# Patient Record
Sex: MALE | Race: BLACK OR AFRICAN AMERICAN
[De-identification: names, ages, dates, MRNs, and addresses within clinical notes are randomized per-mention and may not be internally consistent; named-entity substitution may affect disease eponyms.]

---

## 2019-03-21 ENCOUNTER — HOSPITAL ENCOUNTER (INPATIENT)
Dept: HOSPITAL 74 - JER | Age: 79
LOS: 4 days | Discharge: HOME | DRG: 189 | End: 2019-03-25
Attending: INTERNAL MEDICINE | Admitting: INTERNAL MEDICINE
Payer: COMMERCIAL

## 2019-03-21 VITALS — BODY MASS INDEX: 17.7 KG/M2

## 2019-03-21 DIAGNOSIS — F17.210: ICD-10-CM

## 2019-03-21 DIAGNOSIS — E43: ICD-10-CM

## 2019-03-21 DIAGNOSIS — K59.00: ICD-10-CM

## 2019-03-21 DIAGNOSIS — I10: ICD-10-CM

## 2019-03-21 DIAGNOSIS — E78.5: ICD-10-CM

## 2019-03-21 DIAGNOSIS — E16.2: ICD-10-CM

## 2019-03-21 DIAGNOSIS — F14.10: ICD-10-CM

## 2019-03-21 DIAGNOSIS — I45.81: ICD-10-CM

## 2019-03-21 DIAGNOSIS — J18.9: ICD-10-CM

## 2019-03-21 DIAGNOSIS — D64.9: ICD-10-CM

## 2019-03-21 DIAGNOSIS — E88.09: ICD-10-CM

## 2019-03-21 DIAGNOSIS — J44.1: ICD-10-CM

## 2019-03-21 DIAGNOSIS — G92: ICD-10-CM

## 2019-03-21 DIAGNOSIS — B18.2: ICD-10-CM

## 2019-03-21 DIAGNOSIS — J96.21: ICD-10-CM

## 2019-03-21 DIAGNOSIS — G62.9: ICD-10-CM

## 2019-03-21 DIAGNOSIS — I25.10: ICD-10-CM

## 2019-03-21 DIAGNOSIS — R64: ICD-10-CM

## 2019-03-21 DIAGNOSIS — J96.22: Primary | ICD-10-CM

## 2019-03-21 DIAGNOSIS — F11.20: ICD-10-CM

## 2019-03-21 LAB
ALBUMIN SERPL-MCNC: 2.4 G/DL (ref 3.4–5)
ALP SERPL-CCNC: 69 U/L (ref 45–117)
ALT SERPL-CCNC: 13 U/L (ref 13–61)
ANION GAP SERPL CALC-SCNC: 2 MMOL/L (ref 8–16)
ARTERIAL BLOOD GAS PCO2: 73.4 MMHG (ref 35–45)
ARTERIAL BLOOD GAS PCO2: 76.8 MMHG (ref 35–45)
ARTERIAL PATENCY WRIST A: POSITIVE
ARTERIAL PATENCY WRIST A: POSITIVE
AST SERPL-CCNC: 19 U/L (ref 15–37)
BASE EXCESS BLDA CALC-SCNC: 5.6 MEQ/L (ref -2–2)
BASE EXCESS BLDA CALC-SCNC: 5.6 MEQ/L (ref -2–2)
BASOPHILS # BLD: 0.2 % (ref 0–2)
BILIRUB SERPL-MCNC: 0.3 MG/DL (ref 0.2–1)
BNP SERPL-MCNC: 1295.9 PG/ML (ref 5–450)
BUN SERPL-MCNC: 11 MG/DL (ref 7–18)
CALCIUM SERPL-MCNC: 8.1 MG/DL (ref 8.5–10.1)
CHLORIDE SERPL-SCNC: 102 MMOL/L (ref 98–107)
CO2 SERPL-SCNC: 34 MMOL/L (ref 21–32)
COHGB MFR BLD: 1.8 % (ref 0–2)
COHGB MFR BLD: 2 % (ref 0–2)
CREAT SERPL-MCNC: 0.6 MG/DL (ref 0.55–1.3)
DEPRECATED RDW RBC AUTO: 17.4 % (ref 11.9–15.9)
EOSINOPHIL # BLD: 1 % (ref 0–4.5)
GLUCOSE SERPL-MCNC: 68 MG/DL (ref 74–106)
HCT VFR BLD CALC: 29.3 % (ref 35.4–49)
HGB BLD-MCNC: 9.3 GM/DL (ref 11.7–16.9)
LYMPHOCYTES # BLD: 31.9 % (ref 8–40)
MCH RBC QN AUTO: 27.8 PG (ref 25.7–33.7)
MCHC RBC AUTO-ENTMCNC: 31.9 G/DL (ref 32–35.9)
MCV RBC: 87 FL (ref 80–96)
MONOCYTES # BLD AUTO: 12.9 % (ref 3.8–10.2)
NEUTROPHILS # BLD: 54 % (ref 42.8–82.8)
PLATELET # BLD AUTO: 305 K/MM3 (ref 134–434)
PMV BLD: 7.3 FL (ref 7.5–11.1)
PO2 BLDA: 36.9 MMHG (ref 80–105)
PO2 BLDA: 89.3 MMHG (ref 80–105)
POTASSIUM SERPLBLD-SCNC: 4.5 MMOL/L (ref 3.5–5.1)
PROT SERPL-MCNC: 7.7 G/DL (ref 6.4–8.2)
RBC # BLD AUTO: 3.37 M/MM3 (ref 4–5.6)
SAO2 % BLDA: 56.9 % (ref 95–98)
SAO2 % BLDA: 96 % (ref 95–98)
SODIUM SERPL-SCNC: 139 MMOL/L (ref 136–145)
WBC # BLD AUTO: 4.4 K/MM3 (ref 4–10)
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
## 2019-03-22 LAB
ALBUMIN SERPL-MCNC: 2.5 G/DL (ref 3.4–5)
ALP SERPL-CCNC: 72 U/L (ref 45–117)
ALT SERPL-CCNC: 13 U/L (ref 13–61)
ANION GAP SERPL CALC-SCNC: 3 MMOL/L (ref 8–16)
APTT BLD: 68 SECONDS (ref 25.2–36.5)
APTT BLD: 69.9 SECONDS (ref 25.2–36.5)
ARTERIAL BLOOD GAS PCO2: 63.1 MMHG (ref 35–45)
ARTERIAL PATENCY WRIST A: POSITIVE
AST SERPL-CCNC: 18 U/L (ref 15–37)
BASE EXCESS BLDA CALC-SCNC: 8.8 MEQ/L (ref -2–2)
BASOPHILS # BLD: 0.3 % (ref 0–2)
BILIRUB SERPL-MCNC: 0.6 MG/DL (ref 0.2–1)
BUN SERPL-MCNC: 11 MG/DL (ref 7–18)
CALCIUM SERPL-MCNC: 8.2 MG/DL (ref 8.5–10.1)
CHLORIDE SERPL-SCNC: 99 MMOL/L (ref 98–107)
CO2 SERPL-SCNC: 34 MMOL/L (ref 21–32)
CREAT SERPL-MCNC: 0.6 MG/DL (ref 0.55–1.3)
DEPRECATED RDW RBC AUTO: 17.9 % (ref 11.9–15.9)
EOSINOPHIL # BLD: 0.6 % (ref 0–4.5)
GLUCOSE SERPL-MCNC: 123 MG/DL (ref 74–106)
HCT VFR BLD CALC: 30.8 % (ref 35.4–49)
HGB BLD-MCNC: 9.8 GM/DL (ref 11.7–16.9)
INR BLD: 1.17 (ref 0.83–1.09)
INR BLD: 1.21 (ref 0.83–1.09)
LYMPHOCYTES # BLD: 20.1 % (ref 8–40)
MAGNESIUM SERPL-MCNC: 2 MG/DL (ref 1.8–2.4)
MCH RBC QN AUTO: 27.7 PG (ref 25.7–33.7)
MCHC RBC AUTO-ENTMCNC: 31.7 G/DL (ref 32–35.9)
MCV RBC: 87.2 FL (ref 80–96)
MONOCYTES # BLD AUTO: 8.9 % (ref 3.8–10.2)
NEUTROPHILS # BLD: 70.1 % (ref 42.8–82.8)
PLATELET # BLD AUTO: 323 K/MM3 (ref 134–434)
PMV BLD: 7.4 FL (ref 7.5–11.1)
PO2 BLDA: 99.6 MMHG (ref 80–105)
POTASSIUM SERPLBLD-SCNC: 3.9 MMOL/L (ref 3.5–5.1)
PROT SERPL-MCNC: 8.2 G/DL (ref 6.4–8.2)
PT PNL PPP: 13.8 SEC (ref 9.7–13)
PT PNL PPP: 14.3 SEC (ref 9.7–13)
RBC # BLD AUTO: 3.53 M/MM3 (ref 4–5.6)
SAO2 % BLDA: 97.6 % (ref 95–98)
SODIUM SERPL-SCNC: 136 MMOL/L (ref 136–145)
WBC # BLD AUTO: 6.2 K/MM3 (ref 4–10)

## 2019-03-22 RX ADMIN — METOPROLOL TARTRATE SCH: 25 TABLET, FILM COATED ORAL at 22:32

## 2019-03-22 RX ADMIN — ATORVASTATIN CALCIUM SCH MG: 80 TABLET, FILM COATED ORAL at 22:32

## 2019-03-22 RX ADMIN — GUAIFENESIN SCH MG: 600 TABLET, EXTENDED RELEASE ORAL at 09:40

## 2019-03-22 RX ADMIN — BUDESONIDE AND FORMOTEROL FUMARATE DIHYDRATE SCH: 160; 4.5 AEROSOL RESPIRATORY (INHALATION) at 10:32

## 2019-03-22 RX ADMIN — CLOPIDOGREL BISULFATE SCH MG: 75 TABLET, FILM COATED ORAL at 10:31

## 2019-03-22 RX ADMIN — PREDNISONE SCH MG: 20 TABLET ORAL at 09:40

## 2019-03-22 RX ADMIN — SENNOSIDES SCH TAB: 8.6 TABLET, FILM COATED ORAL at 22:34

## 2019-03-22 RX ADMIN — METHADONE HYDROCHLORIDE SCH MG: 40 TABLET ORAL at 17:49

## 2019-03-22 RX ADMIN — BUDESONIDE AND FORMOTEROL FUMARATE DIHYDRATE SCH PUFF: 160; 4.5 AEROSOL RESPIRATORY (INHALATION) at 22:50

## 2019-03-22 RX ADMIN — METOPROLOL TARTRATE SCH MG: 25 TABLET, FILM COATED ORAL at 09:40

## 2019-03-22 RX ADMIN — CEFTRIAXONE SCH MLS/HR: 1 INJECTION, POWDER, FOR SOLUTION INTRAMUSCULAR; INTRAVENOUS at 10:11

## 2019-03-22 RX ADMIN — GUAIFENESIN SCH MG: 600 TABLET, EXTENDED RELEASE ORAL at 22:34

## 2019-03-22 RX ADMIN — DOXYCYCLINE HYCLATE SCH MG: 100 CAPSULE ORAL at 19:55

## 2019-03-22 RX ADMIN — NICOTINE SCH: 21 PATCH TRANSDERMAL at 10:00

## 2019-03-22 RX ADMIN — ENOXAPARIN SODIUM SCH MG: 40 INJECTION SUBCUTANEOUS at 10:31

## 2019-03-22 RX ADMIN — POLYETHYLENE GLYCOL 3350 SCH: 17 POWDER, FOR SOLUTION ORAL at 10:00

## 2019-03-22 NOTE — CON.PULM
Consult


Consult Specialty:: PULMONARY


Referred by:: JOHN


Reason for Consultation:: HYPOXIC





- History of Present Illness


History of Present Illness: 








patient is a 77 y/o male who was sent in by his PCP for low O2 sat 84%, sob 

worse with exertion, change in his sputum to yellow, and increased cough.  He 

is a current heroin abuser and is on methadone 70 QD and his last heroin use 

was one day pta.  He has h/o CAD and was stented ~1yr ago and is on ASA and 

plavix.   He was placed on BiPap when he got to the ER and was given a dose of 

lasix and nebs. 








- History Source


History Provided By: Medical Record


Limitations to Obtaining History: Clinical Condition





- Past Medical History


Cardio/Vascular: Yes: CAD, Other (STENTS)


Pulmonary: Yes: COPD, Pneumonia.  No: O2 Dependent


Heme/Onc: Yes: Anemia


Psych: Yes: Addictions





- Alcohol/Substance Use


Hx Alcohol Use: No





- Smoking History


Smoking history: Current every day smoker


Have you smoked in the past 12 months: Yes


Aproximately how many cigarettes per day: 10





- Social History


Usual Living Arrangement: Other


Place of Birth: [place]


History of Recent Travel: No





Home Medications





- Allergies


Allergies/Adverse Reactions: 


 Allergies











Allergy/AdvReac Type Severity Reaction Status Date / Time


 


No Known Allergies Allergy   Verified 03/22/19 02:11














- Home Medications


Home Medications: 


Ambulatory Orders





Albuterol Sulfate Inhaler - [Ventolin HFA Inhaler -] 1 - 2 inh PO QID PRN 06/05/ 14 


Atorvastatin Calcium 80 mg PO DAILY 03/22/19 


Clopidogrel Bisulfate [Plavix -] 75 mg PO DAILY 03/22/19 


Ferrous Sulfate 325 mg PO ASDIR 03/22/19 


Furosemide [Lasix -] 40 mg PO DAILY 03/22/19 


Metoprolol Tartrate [Lopressor -] 25 mg PO BID 03/22/19 


Multivitamins [Tab-A-Vit -] 1 tab PO DAILY 03/22/19 











Family Disease History





- Family Disease History


Family History: Unable to Obtain





Review of Systems


Unable to obtain ROS, reason: UNABLE TO OBTAIN





Physical Exam


Vital Sings: 


 Vital Signs











Temperature  97.9 F   03/22/19 15:57


 


Pulse Rate  90   03/22/19 15:57


 


Respiratory Rate  18   03/22/19 15:57


 


Blood Pressure  146/94   03/22/19 15:57


 


O2 Sat by Pulse Oximetry (%)  100   03/22/19 16:41











Constitutional: Yes: Cachectic, Poor Hygeine, Other (LETHARGIC)


Eyes: Yes: EOM Intact


HENT: Yes: Normocephalic


Neck: Yes: Trachea Midline


Cardiovascular: Yes: S1, S2


Respiratory: Yes: Diminished


Gastrointestinal: Yes: Normal Bowel Sounds


Edema: No


Neurological: Yes: Lethargy, Pre-Existing Deficit, Weakness


Labs: 


 CBC, BMP





 03/22/19 03:05 





 03/22/19 03:05 





 ABG Results











ABG pH  7.37  (7.35-7.45)   03/22/19  09:43    


 


ABG pCO2 at Pt Temp  63.1 mmHg (35-45)  H  03/22/19  09:43    


 


ABG pO2 at Pt Temp  99.6 mmHg ()   03/22/19  09:43    


 


ABG HCO3  35.3 mmol/L (22-27)  H  03/22/19  09:43    


 


ABG O2 Sat (Measured)  97.6 % (95-98)   03/22/19  09:43    


 


ABG O2 Content  13.2 % vol (15-22)  L  03/22/19  09:43    


 


ABG Base Excess  8.8 meq/l (-2-2)  H  03/22/19  09:43    








REST REVIEWED





Imaging





- Results


Chest X-ray: Report Reviewed, Image Reviewed


Cat Scan: Report Reviewed, Image Reviewed





Problem List





- Problems


(1) Heroin addiction


Code(s): F11.20 - OPIOID DEPENDENCE, UNCOMPLICATED   





(2) Acute hypercapnic respiratory failure


Code(s): J96.02 - ACUTE RESPIRATORY FAILURE WITH HYPERCAPNIA   





(3) COPD (chronic obstructive pulmonary disease)


Code(s): J44.9 - CHRONIC OBSTRUCTIVE PULMONARY DISEASE, UNSPECIFIED   


Qualifiers: 


   COPD type: unspecified COPD   Qualified Code(s): J44.9 - Chronic obstructive 

pulmonary disease, unspecified   





(4) Hepatitis C


Code(s): B19.20 - UNSPECIFIED VIRAL HEPATITIS C WITHOUT HEPATIC COMA   


Qualifiers: 


   Viral hepatitis chronicity: chronic 





(5) Nicotine dependence


Code(s): F17.200 - NICOTINE DEPENDENCE, UNSPECIFIED, UNCOMPLICATED   


Qualifiers: 


   Nicotine product type: cigarettes 





(6) Opioid dependence with withdrawal


Code(s): F11.23 - OPIOID DEPENDENCE WITH WITHDRAWAL   





Assessment/Plan





ACUTE ON CHRONIC HYPERCAPNEIC RESP FAILURE


COPD/ACTIVE SMOKER


HERION ABUSE/METHADONE MAINTENANCE


ASHD/CAD S/P PCI STENT





WILL LIKELY NEED NIPPV/O2 SUPPLEMENTATION/BRONCHODILATION/TAPER STEROIDS/


TRIAL OF ANTIBIOTICS/AVOID OVERSEDATION/DVT PROPHYLAXSIS








SHERRY HARMON MD
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
## 2019-03-22 NOTE — PN
Teaching Attending Note


Name of Resident: Kb Mustafa





ATTENDING PHYSICIAN STATEMENT





I saw and evaluated the patient.


I reviewed the resident's note and discussed the case with the resident.


I agree with the resident's findings and plan as documented.








SUBJECTIVE:


Seen and examined; please refer to resident note for further historical 

information.  Briefly, patient is a 77 y/o male who was sent in by his PCP for 

low O2 sat 84% in the office.  He has some SOB worse with exertion, change in 

his sputum to yellow, and increased cough.  No recent abx, etc.  He is a 

current heroin abuser and is on methadone 70 QD and his last heroin use was 

yesterday.   He is noted to have LE edema with concurrent venous stasis changes 

and he is furthermore noted that he is on home lasix.  He has an elevated BNP.  

He does have CAD and was stented ~1yr ago and is on ASA and plavix.  He is, 

unfortunately, a poor historian and his family left (who provided hx for 

resident and ED) so I didn't have a chance to confirm it.  He was placed on 

BiPap when he got to the ER and was given a dose of lasix and some nebs and he 

improved; CT chest shows multiple nodular infiltrates and advanced 

emphysematous changes that are likely infectious vs. malignant per the prelim 

read.  He was weaned from BiPap and is satting high 90s on NC.





10 sys ROS done and negative aside from HPI


PMH, PSH, Family Hx, Social Hx reviewed


Medication list reviewed; pending reconciliation


 Home Medications











 Medication  Instructions  Recorded


 


Albuterol Sulfate Inhaler - 1 - 2 inh PO QID PRN 06/05/14





[Ventolin HFA Inhaler -]  


 


Atorvastatin Calcium 80 mg PO DAILY 03/22/19


 


Clopidogrel Bisulfate [Plavix -] 75 mg PO DAILY 03/22/19


 


Ferrous Sulfate 325 mg PO ASDIR 03/22/19


 


Furosemide [Lasix -] 40 mg PO DAILY 03/22/19


 


Metoprolol Tartrate [Lopressor -] 25 mg PO BID 03/22/19


 


Multivitamins [Tab-A-Vit -] 1 tab PO DAILY 03/22/19














OBJECTIVE:


VS, labs, imaging reviewed


NAD, AAO, Resting in bed off BiPap


NC AT EOMI; some temporal wasting


B/l wheezes with some scattered mild rales with no focal consolidation; w/ sym 

exp


NT ND +BS


RRR s1/2 no mgr


CN2-12 wnl, no fnd


Normal mood, appropriate affect, poor insight and difficult to understand speech





Prelim CT shows advanced emphysematous changes with multiple nodular 

infiltrates that could be infectious, but with some nodular components what 

could lead to consideration of underlying neoplasm





ASSESSMENT AND PLAN:


Patient presented with mixed respiratory failure found to be desatting in PCP 

office; now weaned off BiPap.  He is hemodynamically stable and afebrile.  

Cause of his respiratory issues likely COPD exacerbation with fluid overload +/

- CAP vs. underlying malignancy





1) Acute Respiratory Failure


-Improved; based on ABG and hx has a strong chronic component to his 

respiratory failure with some acute input.


-PRN O2; I suspect he will need to be on longterm O2 and he should be checked 

for this prior to DC


-Treat underlying conditions (to be addressed separately)





2) Acute COPD Exacerbation


-PRN O2; PO prednisone 60 QD, doxycycline PO given prolonged QTc, ATC duonebs 

and PRN albuterol


-Consulting pulmonary medicine (Dr. Campbell) for medication optimization.  He 

will need LABA/LAMA, etc. on DC most likely.  No PFTs available.


-Counseled to stop smoking





3) Acute CHF Exacerbation


-Elevated BNP, edema. Monitor strict Is and Os, daily weights. Low sodium diet 

and fluid restriction.


-Lasix 40 IV QD and consider CV consult; checking echo.  Want to obtain old 

records (he is unsure of the name of his primry CV and hasn't followed up with 

him in 'a while')


-Consider switching MT to MS and adding ACE/ARB as BP and renal function 

permits.  Depending on severity of CHF can also consider spironolactone and 

hydralazine.  


-Given his stated history of stenting would assume ischemic cardiomyopathy 

though he has multiple risk factors for NICM.  Need to obtain additional info.





4) Nodular infiltrates (PNA vs. malignancy)


-Seen on prelim CT chest; followup final read.  Does have productive cough but 

notably he is afebrile with a normal white count.


-Empirically covering with ceftriaxone and azithro for CAP and will also 

consult oncology.  Pulmonary is also following and we will defer further 

diagnostic testing to their service.


-Followup sputum cx, blood cx, urine Ag, influenza, viral pcr





5) Heroin Abuse


-Confirm and continue methadone, encourage cessasion


-Checking Hep C and HIV screen





6) Cocaine abuse


-At risk for NICM and sudden cardiac death; no current chest pain.  Could have 

precipitated CHF.





7) Tobacco abuse


-Encourage cesssion





8) Hypoalbuminemia


-Check prealbumin and consider nutrition consult





9) Normocytic anemia


-Trend CBC when inpatient; consider checking iron studies inpt vs. outpt





10) Hx CAD


-States he was stented last year; continue ASA, statin, BB, and plavix.  Obtain 

old records when he finds out who his OP cardio is.  No chest pain.  Has 

multiple risk factors. 





FENA


-2l fluid restriction


-PRN replete; optimize Mg and K


-Cardiac Diet with low salt


-As tolerated





Full Code

## 2019-03-22 NOTE — EKG
Test Reason : 

Blood Pressure : ***/*** mmHG

Vent. Rate : 080 BPM     Atrial Rate : 080 BPM

   P-R Int : 162 ms          QRS Dur : 082 ms

    QT Int : 430 ms       P-R-T Axes : 058 075 087 degrees

   QTc Int : 495 ms

 

NORMAL SINUS RHYTHM WITH SINUS ARRHYTHMIA

POSSIBLE LEFT ATRIAL ENLARGEMENT

PROLONGED QT

ABNORMAL ECG

 

Confirmed by NAEEM GOMEZ MD ([id number]) on 3/22/2019 11:03:32 AM

 

Referred By:             Confirmed By:NAEEM GOMEZ MD
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
## 2019-03-22 NOTE — ECHO
______________________________________________________________________________



Name: RUBI OSULLIVAN                                   Exam:Adult Echocardiogram

MRN: [id number]         Study Date: 2019 07:47 AM

Age: 78 yrs

______________________________________________________________________________



Reason For Study: CHF

Height: 69 in        Weight: 120 lb        BSA: 1.7 m2



______________________________________________________________________________



MMode/2D Measurements & Calculations

IVSd: 0.85 cm                                          Ao root diam: 3.7 cm

LVIDd: 4.8 cm                                          LA dimension: 3.0 cm

LVIDs: 3.8 cm

LVPWd: 0.84 cm



________________________________________________________

EDV(Teich): 107.7 ml                                   LVOT diam: 2.6 cm

ESV(Teich): 60.3 ml



Doppler Measurements & Calculations

MV E max klaus: 78.5 cm/sec                                 AI P1/2t: 581.9 msec

MV A max klaus: 92.3 cm/sec

MV E/A: 0.85

MV dec time: 0.33 sec



___________________________________________________________

AI max klaus: 344.2 cm/sec                                  TR max klaus: 311.6 cm/sec

AI max P.4 mmHg                                      TR max P.9 mmHg



AI dec slope: 173.2 cm/sec2

___________________________________________________________

Med Peak E' Klaus: 2.9 cm/sec

Med E/e': 27.5

Lat Peak E' Klaus: 7.1 cm/sec

Lat E/e': 11.0





______________________________________________________________________________



Left Ventricle

Ejection Fraction = 40-45%. Severe inferolateral hypokinesis.

Right Ventricle

The right ventricle is normal in size and function.

Atria

Normal left and right atrial size and function.

Mitral Valve

The mitral valve is grossly normal. There is no mitral valve stenosis. There is mild mitral regurgita
tion.

Tricuspid Valve

The tricuspid valve is normal in structure and function. There is mild tricuspid regurgitation. Right


ventricular systolic pressure is elevated at 40-50mmHg.

Aortic Valve

The aortic valve is trileaflet. No hemodynamically significant valvular aortic stenosis. Mild aortic

regurgitation.

Pulmonic Valve

The pulmonic valve is not well seen, but is grossly normal. There is no pulmonic valvular stenosis.

Great Vessels

Mild aortic root dilatation.

Pericardium/Pleura

There is no pericardial effusion.

______________________________________________________________________________





Interpretation Summary

Ejection Fraction = 40-45%.

Severe inferolateral hypokinesis.

Mild aortic root dilatation.

The right ventricle is normal in size and function.

There is mild mitral regurgitation.

There is mild tricuspid regurgitation.

Right ventricular systolic pressure is elevated at 40-50mmHg.

Mild aortic regurgitation.

There is no pericardial effusion.





MD Barcenas *Dashawn 2019 10:37 AM

## 2019-03-22 NOTE — EKG
Test Reason : 

Blood Pressure : ***/*** mmHG

Vent. Rate : 073 BPM     Atrial Rate : 073 BPM

   P-R Int : 164 ms          QRS Dur : 078 ms

    QT Int : 460 ms       P-R-T Axes : 048 034 068 degrees

   QTc Int : 506 ms

 

*** POOR DATA QUALITY, INTERPRETATION MAY BE ADVERSELY AFFECTED

NORMAL SINUS RHYTHM

PROLONGED QT

ABNORMAL ECG

Confirmed by NAEEM GOMEZ MD ([id number]) on 3/22/2019 10:58:12 AM

 

Referred By:             Confirmed By:NAEEM GOMEZ MD

## 2019-03-23 LAB
ALBUMIN SERPL-MCNC: 2.1 G/DL (ref 3.4–5)
ALP SERPL-CCNC: 63 U/L (ref 45–117)
ALT SERPL-CCNC: 11 U/L (ref 13–61)
ANION GAP SERPL CALC-SCNC: 3 MMOL/L (ref 8–16)
AST SERPL-CCNC: 12 U/L (ref 15–37)
BILIRUB SERPL-MCNC: 0.2 MG/DL (ref 0.2–1)
BUN SERPL-MCNC: 26 MG/DL (ref 7–18)
CALCIUM SERPL-MCNC: 8.1 MG/DL (ref 8.5–10.1)
CHLORIDE SERPL-SCNC: 98 MMOL/L (ref 98–107)
CO2 SERPL-SCNC: 35 MMOL/L (ref 21–32)
CREAT SERPL-MCNC: 0.8 MG/DL (ref 0.55–1.3)
DEPRECATED RDW RBC AUTO: 17.5 % (ref 11.9–15.9)
GLUCOSE SERPL-MCNC: 144 MG/DL (ref 74–106)
HCT VFR BLD CALC: 29.8 % (ref 35.4–49)
HGB BLD-MCNC: 9.6 GM/DL (ref 11.7–16.9)
MAGNESIUM SERPL-MCNC: 2.1 MG/DL (ref 1.8–2.4)
MCH RBC QN AUTO: 27.5 PG (ref 25.7–33.7)
MCHC RBC AUTO-ENTMCNC: 32.1 G/DL (ref 32–35.9)
MCV RBC: 85.6 FL (ref 80–96)
PHOSPHATE SERPL-MCNC: 3.8 MG/DL (ref 2.5–4.9)
PLATELET # BLD AUTO: 308 K/MM3 (ref 134–434)
PMV BLD: 7.6 FL (ref 7.5–11.1)
POTASSIUM SERPLBLD-SCNC: 4.2 MMOL/L (ref 3.5–5.1)
PROT SERPL-MCNC: 7.2 G/DL (ref 6.4–8.2)
RBC # BLD AUTO: 3.48 M/MM3 (ref 4–5.6)
SERUM IRON SATURATION: 11 % (ref 15–55)
SODIUM SERPL-SCNC: 137 MMOL/L (ref 136–145)
TIBC SERPL-MCNC: 269 UG/DL (ref 250–450)
UIBC SERPL-MCNC: 240 UG/DL (ref 111–343)
WBC # BLD AUTO: 6.8 K/MM3 (ref 4–10)

## 2019-03-23 RX ADMIN — GUAIFENESIN SCH MG: 600 TABLET, EXTENDED RELEASE ORAL at 10:14

## 2019-03-23 RX ADMIN — IPRATROPIUM BROMIDE AND ALBUTEROL SULFATE SCH AMP: .5; 3 SOLUTION RESPIRATORY (INHALATION) at 15:52

## 2019-03-23 RX ADMIN — GUAIFENESIN SCH MG: 600 TABLET, EXTENDED RELEASE ORAL at 22:03

## 2019-03-23 RX ADMIN — METOPROLOL TARTRATE SCH MG: 25 TABLET, FILM COATED ORAL at 22:03

## 2019-03-23 RX ADMIN — CEFTRIAXONE SCH MLS/HR: 1 INJECTION, POWDER, FOR SOLUTION INTRAMUSCULAR; INTRAVENOUS at 10:16

## 2019-03-23 RX ADMIN — DOXYCYCLINE HYCLATE SCH MG: 100 CAPSULE ORAL at 17:33

## 2019-03-23 RX ADMIN — DOXYCYCLINE HYCLATE SCH MG: 100 CAPSULE ORAL at 10:17

## 2019-03-23 RX ADMIN — METHADONE HYDROCHLORIDE SCH: 40 TABLET ORAL at 06:17

## 2019-03-23 RX ADMIN — METOPROLOL TARTRATE SCH: 25 TABLET, FILM COATED ORAL at 10:15

## 2019-03-23 RX ADMIN — SENNOSIDES SCH TAB: 8.6 TABLET, FILM COATED ORAL at 22:03

## 2019-03-23 RX ADMIN — ATORVASTATIN CALCIUM SCH MG: 80 TABLET, FILM COATED ORAL at 22:03

## 2019-03-23 RX ADMIN — IPRATROPIUM BROMIDE AND ALBUTEROL SULFATE SCH: .5; 3 SOLUTION RESPIRATORY (INHALATION) at 21:55

## 2019-03-23 RX ADMIN — POLYETHYLENE GLYCOL 3350 SCH GM: 17 POWDER, FOR SOLUTION ORAL at 10:13

## 2019-03-23 RX ADMIN — NICOTINE SCH MG: 21 PATCH TRANSDERMAL at 10:16

## 2019-03-23 RX ADMIN — BUDESONIDE AND FORMOTEROL FUMARATE DIHYDRATE SCH PUFF: 160; 4.5 AEROSOL RESPIRATORY (INHALATION) at 22:03

## 2019-03-23 RX ADMIN — ENOXAPARIN SODIUM SCH MG: 40 INJECTION SUBCUTANEOUS at 10:12

## 2019-03-23 RX ADMIN — BUDESONIDE AND FORMOTEROL FUMARATE DIHYDRATE SCH PUFF: 160; 4.5 AEROSOL RESPIRATORY (INHALATION) at 10:16

## 2019-03-23 RX ADMIN — CLOPIDOGREL BISULFATE SCH MG: 75 TABLET, FILM COATED ORAL at 10:14

## 2019-03-23 RX ADMIN — PREDNISONE SCH MG: 20 TABLET ORAL at 10:15

## 2019-03-23 RX ADMIN — METHADONE HYDROCHLORIDE SCH MG: 40 TABLET ORAL at 16:41

## 2019-03-23 NOTE — EKG
Test Reason : 

Blood Pressure : ***/*** mmHG

Vent. Rate : 064 BPM     Atrial Rate : 064 BPM

   P-R Int : 176 ms          QRS Dur : 086 ms

    QT Int : 522 ms       P-R-T Axes : 064 076 085 degrees

   QTc Int : 538 ms

 

NORMAL SINUS RHYTHM

LVH MAY BE PRESENT BY VOLTAGE CRITERIA

POSSIBLE LEFT ATRIAL ENLARGEMENT

PROLONGED QT

ABNORMAL ECG

WHEN COMPARED WITH ECG OF 22-MAR-2019 02:56,

NO SIGNIFICANT CHANGE WAS FOUND

Confirmed by ELI CANADA, ANASTASIYA ([id number]) on 3/23/2019 4:59:55 PM

 

Referred By:             Confirmed By:ANASTASIYA BENITEZ MD

## 2019-03-23 NOTE — PN
Teaching Attending Note


Name of Resident: Jonathan Rosales





ATTENDING PHYSICIAN STATEMENT





I saw and evaluated the patient.


I reviewed the resident's note and discussed the case with the resident.


I agree with the resident's findings and plan as documented.








SUBJECTIVE:





Feels better , no HA , no CP , no SOB . 





OBJECTIVE:


NAD, awake, and cooperative 


Cv: RRR, no MRG, no JVD 


Lungs: decreased air entry bilaterally, no wheezing or crackles 


Ext: scarred skin with trace edema on legs.








ASSESSMENT AND PLAN:





77 y/o man with h/o COPD, active smoking, active heroin use while on Methadone, 

CAD , s/p stenting, who presented with hypoxia . he was found to have acuete 

hypoxic hypercapnic respiratory failure 





1- Acute hypoxic , hypercapnic resp failure due to acute COPD exacerbation. 

possible b/l PNA vs old scarring 


condition slightly improved 


- switch to NC to keep Sat O2 88-93 %. 


- cont steorids, Nebs , and Inhalers. 


- cont Abx 


- echo reviewed, mild reductionin EF, but he does not look fluid overloaded 


- cont to use BIPAP at night . 








2- Heroin abuse. while on methadone 70 mg 


- will cont his home dose for now. 


-Appreciate Dr. Valencia help, his dose to be adjusted as out pt 





3- Prolonged QTC: due to methadone and heroin use. 


- avoid meds that can worsen it 





4- Normocytic anemia: iron studies do not suggest iron def. B12, folate NL. f/u 

as out pt 


5- severe protein calorie malnutrition 


6- metabolic encephalopathy , due to hypercapnia , resolved. b12, RPR, TSH nl





DVT PX 


PT eval 





HLOC

## 2019-03-23 NOTE — PN
Progress Note (short form)





- Note


Progress Note: 





PULMONARY








VSS/AFEBRILE


Constitutional: Yes: Cachectic, Poor Hygeine, Other (LETHARGIC)


Eyes: Yes: EOM Intact


HENT: Yes: Normocephalic


Neck: Yes: Trachea Midline


Cardiovascular: Yes: S1, S2


Respiratory: Yes: Diminished


Gastrointestinal: Yes: Normal Bowel Sounds


Edema: No


Neurological: Yes: Lethargy, Pre-Existing Deficit, Weakness





Labs/notes/images reviewed





- Problems


(1) Heroin addiction


Code(s): F11.20 - OPIOID DEPENDENCE, UNCOMPLICATED   





(2) Acute hypercapnic respiratory failure


Code(s): J96.02 - ACUTE RESPIRATORY FAILURE WITH HYPERCAPNIA   





(3) COPD (chronic obstructive pulmonary disease)


Code(s): J44.9 - CHRONIC OBSTRUCTIVE PULMONARY DISEASE, UNSPECIFIED   


Qualifiers: 


   COPD type: unspecified COPD   Qualified Code(s): J44.9 - Chronic obstructive 

pulmonary disease, unspecified   





(4) Hepatitis C


Code(s): B19.20 - UNSPECIFIED VIRAL HEPATITIS C WITHOUT HEPATIC COMA   


Qualifiers: 


   Viral hepatitis chronicity: chronic 





(5) Nicotine dependence


Code(s): F17.200 - NICOTINE DEPENDENCE, UNSPECIFIED, UNCOMPLICATED   


Qualifiers: 


   Nicotine product type: cigarettes 





(6) Opioid dependence with withdrawal


Code(s): F11.23 - OPIOID DEPENDENCE WITH WITHDRAWAL   





Assessment/Plan





ACUTE ON CHRONIC HYPERCAPNEIC RESP FAILURE


COPD/ACTIVE SMOKER


HERION ABUSE/METHADONE MAINTENANCE


ASHD/CAD S/P PCI STENT





WILL LIKELY NEED NIPPV/O2 SUPPLEMENTATION/BRONCHODILATION/TAPER STEROIDS/


TRIAL OF ANTIBIOTICS/AVOID OVERSEDATION/DVT PROPHYLAXSIS








SHERRY HARMON MD











Problem List





- Problems


(1) Heroin addiction


Code(s): F11.20 - OPIOID DEPENDENCE, UNCOMPLICATED   





(2) Acute hypercapnic respiratory failure


Code(s): J96.02 - ACUTE RESPIRATORY FAILURE WITH HYPERCAPNIA   





(3) COPD (chronic obstructive pulmonary disease)


Code(s): J44.9 - CHRONIC OBSTRUCTIVE PULMONARY DISEASE, UNSPECIFIED   


Qualifiers: 


   COPD type: unspecified COPD   Qualified Code(s): J44.9 - Chronic obstructive 

pulmonary disease, unspecified   





(4) Hepatitis C


Code(s): B19.20 - UNSPECIFIED VIRAL HEPATITIS C WITHOUT HEPATIC COMA   


Qualifiers: 


   Viral hepatitis chronicity: chronic 





(5) Nicotine dependence


Code(s): F17.200 - NICOTINE DEPENDENCE, UNSPECIFIED, UNCOMPLICATED   


Qualifiers: 


   Nicotine product type: cigarettes 





(6) Opioid dependence with withdrawal


Code(s): F11.23 - OPIOID DEPENDENCE WITH WITHDRAWAL
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
## 2019-03-24 LAB
ANION GAP SERPL CALC-SCNC: 4 MMOL/L (ref 8–16)
BASOPHILS # BLD: 0.5 % (ref 0–2)
BUN SERPL-MCNC: 36 MG/DL (ref 7–18)
CALCIUM SERPL-MCNC: 8.1 MG/DL (ref 8.5–10.1)
CHLORIDE SERPL-SCNC: 99 MMOL/L (ref 98–107)
CO2 SERPL-SCNC: 35 MMOL/L (ref 21–32)
CREAT SERPL-MCNC: 0.9 MG/DL (ref 0.55–1.3)
DEPRECATED RDW RBC AUTO: 17.3 % (ref 11.9–15.9)
EOSINOPHIL # BLD: 0.1 % (ref 0–4.5)
GLUCOSE SERPL-MCNC: 78 MG/DL (ref 74–106)
HCT VFR BLD CALC: 32.8 % (ref 35.4–49)
HGB BLD-MCNC: 10.5 GM/DL (ref 11.7–16.9)
LYMPHOCYTES # BLD: 16.6 % (ref 8–40)
MCH RBC QN AUTO: 27.9 PG (ref 25.7–33.7)
MCHC RBC AUTO-ENTMCNC: 31.8 G/DL (ref 32–35.9)
MCV RBC: 87.6 FL (ref 80–96)
MONOCYTES # BLD AUTO: 9.5 % (ref 3.8–10.2)
NEUTROPHILS # BLD: 73.3 % (ref 42.8–82.8)
PLATELET # BLD AUTO: 261 K/MM3 (ref 134–434)
PMV BLD: 8.1 FL (ref 7.5–11.1)
POTASSIUM SERPLBLD-SCNC: 4.7 MMOL/L (ref 3.5–5.1)
RBC # BLD AUTO: 3.75 M/MM3 (ref 4–5.6)
SODIUM SERPL-SCNC: 138 MMOL/L (ref 136–145)
WBC # BLD AUTO: 10.7 K/MM3 (ref 4–10)

## 2019-03-24 RX ADMIN — CEFTRIAXONE SCH MLS/HR: 1 INJECTION, POWDER, FOR SOLUTION INTRAMUSCULAR; INTRAVENOUS at 09:21

## 2019-03-24 RX ADMIN — BUDESONIDE AND FORMOTEROL FUMARATE DIHYDRATE SCH PUFF: 160; 4.5 AEROSOL RESPIRATORY (INHALATION) at 10:00

## 2019-03-24 RX ADMIN — NICOTINE SCH MG: 21 PATCH TRANSDERMAL at 09:21

## 2019-03-24 RX ADMIN — ENOXAPARIN SODIUM SCH MG: 40 INJECTION SUBCUTANEOUS at 09:20

## 2019-03-24 RX ADMIN — DOXYCYCLINE HYCLATE SCH MG: 100 CAPSULE ORAL at 09:20

## 2019-03-24 RX ADMIN — IPRATROPIUM BROMIDE AND ALBUTEROL SULFATE SCH: .5; 3 SOLUTION RESPIRATORY (INHALATION) at 20:02

## 2019-03-24 RX ADMIN — POLYETHYLENE GLYCOL 3350 SCH GM: 17 POWDER, FOR SOLUTION ORAL at 09:32

## 2019-03-24 RX ADMIN — METHADONE HYDROCHLORIDE SCH MG: 40 TABLET ORAL at 05:52

## 2019-03-24 RX ADMIN — GUAIFENESIN SCH MG: 600 TABLET, EXTENDED RELEASE ORAL at 22:15

## 2019-03-24 RX ADMIN — METOPROLOL TARTRATE SCH MG: 25 TABLET, FILM COATED ORAL at 09:18

## 2019-03-24 RX ADMIN — PREDNISONE SCH MG: 20 TABLET ORAL at 09:19

## 2019-03-24 RX ADMIN — IPRATROPIUM BROMIDE AND ALBUTEROL SULFATE SCH: .5; 3 SOLUTION RESPIRATORY (INHALATION) at 08:08

## 2019-03-24 RX ADMIN — IPRATROPIUM BROMIDE AND ALBUTEROL SULFATE SCH: .5; 3 SOLUTION RESPIRATORY (INHALATION) at 15:20

## 2019-03-24 RX ADMIN — ATORVASTATIN CALCIUM SCH MG: 80 TABLET, FILM COATED ORAL at 22:15

## 2019-03-24 RX ADMIN — GUAIFENESIN SCH MG: 600 TABLET, EXTENDED RELEASE ORAL at 09:18

## 2019-03-24 RX ADMIN — IPRATROPIUM BROMIDE AND ALBUTEROL SULFATE SCH: .5; 3 SOLUTION RESPIRATORY (INHALATION) at 11:34

## 2019-03-24 RX ADMIN — DOXYCYCLINE HYCLATE SCH MG: 100 CAPSULE ORAL at 18:08

## 2019-03-24 RX ADMIN — CLOPIDOGREL BISULFATE SCH MG: 75 TABLET, FILM COATED ORAL at 09:18

## 2019-03-24 RX ADMIN — METOPROLOL TARTRATE SCH MG: 25 TABLET, FILM COATED ORAL at 22:15

## 2019-03-24 RX ADMIN — SENNOSIDES SCH TAB: 8.6 TABLET, FILM COATED ORAL at 22:15

## 2019-03-24 NOTE — PN
Progress Note (short form)





- Note


Progress Note: 





Subjective:





no fever or chills. breathing is better 


Objective:








Vital Signs:





 Last Vital Signs











Temp Pulse Resp BP Pulse Ox


 


 98.6 F   72   18   114/63   97 


 


 03/24/19 10:00  03/24/19 10:00  03/24/19 10:00  03/24/19 10:00  03/24/19 09:00











 Laboratory Results - last 24 hr











  03/24/19 03/24/19





  07:00 07:00


 


WBC  10.7 H 


 


RBC  3.75 L 


 


Hgb  10.5 L 


 


Hct  32.8 L 


 


MCV  87.6 


 


MCH  27.9 


 


MCHC  31.8 L 


 


RDW  17.3 H 


 


Plt Count  261 


 


MPV  8.1 


 


Absolute Neuts (auto)  7.8 


 


Neutrophils %  73.3 


 


Lymphocytes %  16.6 


 


Monocytes %  9.5 


 


Eosinophils %  0.1  D 


 


Basophils %  0.5 


 


Nucleated RBC %  0 


 


Sodium   138


 


Potassium   4.7


 


Chloride   99


 


Carbon Dioxide   35 H


 


Anion Gap   4 L


 


BUN   36 H


 


Creatinine   0.9


 


Creat Clearance w eGFR   81.61


 


Random Glucose   78


 


Calcium   8.1 L











Physical Exam:


NAD, awake, and cooperative 


Cv: RRR, no MRG, no JVD 


Lungs: decreased air entry bilaterally, no wheezing or crackles 


Ext: scarred skin with trace edema on legs.








ASSESSMENT AND PLAN:





79 y/o man with h/o COPD, active smoking, active heroin use while on Methadone, 

CAD , s/p stenting, who presented with hypoxia . he was found to have acuete 

hypoxic hypercapnic respiratory failure 





1- Acute hypoxic , hypercapnic resp failure due to acute COPD exacerbation. 

possible b/l PNA vs old scarring 


condition slightly improved 


- cont NC 


- cont steorids ( dose decreased today ) , Nebs , and Inhalers. 


- cont Abx


- cont to use BIPAP at night . 


- pre and post tomorrow 





2- Heroin abuse. while on methadone 70 mg 


- will cont his home dose for now. 


-Appreciate Dr. Valencia help, his dose to be adjusted as out pt 





3- Prolonged QTC: due to methadone and heroin use. 


- avoid meds that can worsen it 





4- Normocytic anemia: iron studies do not suggest iron def. B12, folate NL. f/u 

as out pt 


5- severe protein calorie malnutrition 


6- metabolic encephalopathy , due to hypercapnia , resolved. b12, RPR, TSH nl





DVT PX , and pre-post. ? dc tomorrow 











Visit type





- Emergency Visit


Emergency Visit: Yes


ED Registration Date: 03/21/19


Care time: The patient presented to the Emergency Department on the above date 

and was hospitalized for further evaluation of their emergent condition.





- New Patient


This patient is new to me today: No





- Critical Care


Critical Care patient: No

## 2019-03-24 NOTE — PN
Progress Note (short form)





- Note


Progress Note: 





PULMONARY








VSS/AFEBRILE


Constitutional: Yes: Cachectic, Poor Hygeine, Other (LETHARGIC)


Eyes: Yes: EOM Intact


HENT: Yes: Normocephalic


Neck: Yes: Trachea Midline


Cardiovascular: Yes: S1, S2


Respiratory: Yes: Diminished


Gastrointestinal: Yes: Normal Bowel Sounds


Edema: No


Neurological: Yes: Lethargy, Pre-Existing Deficit, Weakness





Labs/notes/images reviewed





- Problems


(1) Heroin addiction


Code(s): F11.20 - OPIOID DEPENDENCE, UNCOMPLICATED   





(2) Acute hypercapnic respiratory failure


Code(s): J96.02 - ACUTE RESPIRATORY FAILURE WITH HYPERCAPNIA   





(3) COPD (chronic obstructive pulmonary disease)


Code(s): J44.9 - CHRONIC OBSTRUCTIVE PULMONARY DISEASE, UNSPECIFIED   


Qualifiers: 


   COPD type: unspecified COPD   Qualified Code(s): J44.9 - Chronic obstructive 

pulmonary disease, unspecified   





(4) Hepatitis C


Code(s): B19.20 - UNSPECIFIED VIRAL HEPATITIS C WITHOUT HEPATIC COMA   


Qualifiers: 


   Viral hepatitis chronicity: chronic 





(5) Nicotine dependence


Code(s): F17.200 - NICOTINE DEPENDENCE, UNSPECIFIED, UNCOMPLICATED   


Qualifiers: 


   Nicotine product type: cigarettes 





(6) Opioid dependence with withdrawal


Code(s): F11.23 - OPIOID DEPENDENCE WITH WITHDRAWAL   





Assessment/Plan





ACUTE ON CHRONIC HYPERCAPNEIC RESP FAILURE


COPD/ACTIVE SMOKER


HERION ABUSE/METHADONE MAINTENANCE


ASHD/CAD S/P PCI STENT





NIPPV/O2 SUPPLEMENTATION/BRONCHODILATION/TAPERING STEROIDS/


AVOID OVERSEDATION/DVT PROPHYLAXSIS








SHERRY HARMON MD











Problem List





- Problems


(1) Heroin addiction


Code(s): F11.20 - OPIOID DEPENDENCE, UNCOMPLICATED   





(2) Acute hypercapnic respiratory failure


Code(s): J96.02 - ACUTE RESPIRATORY FAILURE WITH HYPERCAPNIA   





(3) COPD (chronic obstructive pulmonary disease)


Code(s): J44.9 - CHRONIC OBSTRUCTIVE PULMONARY DISEASE, UNSPECIFIED   


Qualifiers: 


   COPD type: unspecified COPD   Qualified Code(s): J44.9 - Chronic obstructive 

pulmonary disease, unspecified   





(4) Hepatitis C


Code(s): B19.20 - UNSPECIFIED VIRAL HEPATITIS C WITHOUT HEPATIC COMA   


Qualifiers: 


   Viral hepatitis chronicity: chronic 





(5) Nicotine dependence


Code(s): F17.200 - NICOTINE DEPENDENCE, UNSPECIFIED, UNCOMPLICATED   


Qualifiers: 


   Nicotine product type: cigarettes 





(6) Opioid dependence with withdrawal


Code(s): F11.23 - OPIOID DEPENDENCE WITH WITHDRAWAL

## 2019-03-25 VITALS — SYSTOLIC BLOOD PRESSURE: 93 MMHG | DIASTOLIC BLOOD PRESSURE: 51 MMHG | HEART RATE: 62 BPM

## 2019-03-25 VITALS — TEMPERATURE: 97.3 F

## 2019-03-25 LAB
BASOPHILS # BLD: 0.3 % (ref 0–2)
DEPRECATED RDW RBC AUTO: 17.5 % (ref 11.9–15.9)
EOSINOPHIL # BLD: 0 % (ref 0–4.5)
HCT VFR BLD CALC: 30.8 % (ref 35.4–49)
HGB BLD-MCNC: 9.9 GM/DL (ref 11.7–16.9)
LYMPHOCYTES # BLD: 20.5 % (ref 8–40)
MCH RBC QN AUTO: 28.1 PG (ref 25.7–33.7)
MCHC RBC AUTO-ENTMCNC: 32.1 G/DL (ref 32–35.9)
MCV RBC: 87.6 FL (ref 80–96)
MONOCYTES # BLD AUTO: 7.9 % (ref 3.8–10.2)
NEUTROPHILS # BLD: 71.3 % (ref 42.8–82.8)
PLATELET # BLD AUTO: 310 K/MM3 (ref 134–434)
PMV BLD: 7.7 FL (ref 7.5–11.1)
RBC # BLD AUTO: 3.51 M/MM3 (ref 4–5.6)
WBC # BLD AUTO: 7.9 K/MM3 (ref 4–10)

## 2019-03-25 RX ADMIN — METOPROLOL TARTRATE SCH MG: 25 TABLET, FILM COATED ORAL at 11:05

## 2019-03-25 RX ADMIN — POLYETHYLENE GLYCOL 3350 SCH GM: 17 POWDER, FOR SOLUTION ORAL at 14:43

## 2019-03-25 RX ADMIN — NICOTINE SCH MG: 21 PATCH TRANSDERMAL at 11:05

## 2019-03-25 RX ADMIN — CLOPIDOGREL BISULFATE SCH MG: 75 TABLET, FILM COATED ORAL at 11:04

## 2019-03-25 RX ADMIN — ENOXAPARIN SODIUM SCH MG: 40 INJECTION SUBCUTANEOUS at 11:04

## 2019-03-25 RX ADMIN — IPRATROPIUM BROMIDE AND ALBUTEROL SULFATE SCH: .5; 3 SOLUTION RESPIRATORY (INHALATION) at 11:43

## 2019-03-25 RX ADMIN — BUDESONIDE AND FORMOTEROL FUMARATE DIHYDRATE SCH PUFF: 160; 4.5 AEROSOL RESPIRATORY (INHALATION) at 11:16

## 2019-03-25 RX ADMIN — CEFTRIAXONE SCH MLS/HR: 1 INJECTION, POWDER, FOR SOLUTION INTRAMUSCULAR; INTRAVENOUS at 11:05

## 2019-03-25 RX ADMIN — METHADONE HYDROCHLORIDE SCH MG: 40 TABLET ORAL at 06:06

## 2019-03-25 RX ADMIN — IPRATROPIUM BROMIDE AND ALBUTEROL SULFATE SCH: .5; 3 SOLUTION RESPIRATORY (INHALATION) at 07:52

## 2019-03-25 RX ADMIN — DOXYCYCLINE HYCLATE SCH MG: 100 CAPSULE ORAL at 11:05

## 2019-03-25 RX ADMIN — GUAIFENESIN SCH MG: 600 TABLET, EXTENDED RELEASE ORAL at 11:04

## 2019-03-25 RX ADMIN — BUDESONIDE AND FORMOTEROL FUMARATE DIHYDRATE SCH: 160; 4.5 AEROSOL RESPIRATORY (INHALATION) at 04:14

## 2019-03-25 NOTE — PN
Progress Note (short form)





- Note


Progress Note: 





PULMONARY





Denies shortness of breath, cough or wheezing.





 Vital Signs











 Period  Temp  Pulse  Resp  BP Sys/Perez  Pulse Ox


 


 Last 24 Hr  97.8 F-98.0 F  62-72  18-20  102-123/58-75  92-98








Gen:  NAD, disheveled


Heart: RRR


Lung: distant breath sounds, no wheezes


Abd: soft, nontender


Ext: no edema





 CBC, BMP





 03/25/19 06:40 





 03/24/19 07:00 





Active Medications





Albuterol/Ipratropium (Duoneb -)  1 amp NEB Q4H PRN


   PRN Reason: SHORTNESS OF BREATH


Albuterol/Ipratropium (Duoneb -)  1 amp NEB RQID [facility]


   Last Admin: 03/25/19 07:52 Dose:  Not Given


Atorvastatin Calcium (Lipitor -)  80 mg PO HS [facility]


   Last Admin: 03/24/19 22:15 Dose:  80 mg


Budesonide/Formoterol Fumarate (Symbicort 160/4.5mcg -)  2 puff IH BID [facility]


   Last Admin: 03/25/19 11:16 Dose:  2 puff


Clopidogrel Bisulfate (Plavix -)  75 mg PO DAILY [facility]


   Last Admin: 03/25/19 11:04 Dose:  75 mg


Doxycycline Hyclate (Vibramycin -)  100 mg PO BID@1000,1800 [facility]


   Last Admin: 03/25/19 11:05 Dose:  100 mg


Enoxaparin Sodium (Lovenox -)  40 mg SQ DAILY [facility]


   Last Admin: 03/25/19 11:04 Dose:  40 mg


Guaifenesin (Mucinex -)  600 mg PO BID [facility]


   Last Admin: 03/25/19 11:04 Dose:  600 mg


Ceftriaxone Sodium 1 gm/ (Dextrose)  50 mls @ 100 mls/hr IVPB DAILY [facility]; 

Protocol


   Last Admin: 03/25/19 11:05 Dose:  100 mls/hr


Methadone HCl 40 mg/ Methadone (HCl 30 mg)  70 mg PO DAILY@0600 [facility]


   Last Admin: 03/25/19 06:06 Dose:  70 mg


Metoprolol Tartrate (Lopressor -)  25 mg PO BID [facility]


   Last Admin: 03/25/19 11:05 Dose:  25 mg


Nicotine (Nicoderm Patch -)  21 mg TD DAILY [facility]


   Last Admin: 03/25/19 11:05 Dose:  21 mg


Polyethylene Glycol (Miralax (For Daily Use) -)  17 gm PO DAILY [facility]


   Last Admin: 03/24/19 09:32 Dose:  17 gm


Prednisone (Deltasone -)  40 mg PO DAILY [facility]


   Last Admin: 03/25/19 11:04 Dose:  40 mg


Senna (Senna -)  2 tab PO HS [facility]


   Last Admin: 03/24/19 22:15 Dose:  2 tab





A/P


Acute on Chronic Hypoxic and Hypercapneic Respiratory Failure


Acute COPD Exacerbation


CAD


Heroin Abuse


Smoker





-  prednisone taper


-  inhaled bronchodilators


-  O2 to keep SpO2 >90%


-  check ambulatory SpO2 on room air to assess for home O2


-  DVT prophylaxis


-  can d/c home from pulmonary standpoint on prednisone taper and inhaled 

bronchodilators


-  smoking cessation

## 2019-03-25 NOTE — CONSULT
Admitting History and Physical





- Past Medical History


Cardiovascular: Yes: CAD, Other (STENTS)


Pulmonary: Yes: COPD, Pneumonia.  No: O2 Dependent


Heme/Onc: Yes: Anemia


Psych: Yes: Addictions





- Smoking History


Smoking history: Current every day smoker


Have you smoked in the past 12 months: Yes


Aproximately how many cigarettes per day: 10





- Alcohol/Substance Use


Hx Alcohol Use: No





- Social History


History of Recent Travel: No





History





- Admission


Reason For Visit: ACUTE RESPIRATORY FAILURE





- Hearing


Hearing: Normal





Speech Evaluation





- Communication


Primary Language: ENGLISH


Communication: Yes: Simple Responses, Dysarthria


Oral Expression Ability: Yes: Mild Impairment (Reduced articulation, slurred 

speech WFL for single words)





- Speech Production


Dysarthria: Yes: Flaccid


Apraxia: No


Able to Make Needs Known: Yes: Mildly Impaired


Intelligibility: Yes: Moderately Impaired (with long phrases and sentences)





- Speech Characteristics


Voice Loudness: Normal


Voice Pitch: Yes: Normal


Voice Phonatory-based Quality: Yes: Normal


Speech Pattern: Impaired


Speech Clarity: < 50%


Nasal Resonance: Normal


Articulation: Yes: Imprecise


Rate of Speech: Too Slow


Voice Comment: Impaired speech, reduced articulation.  Cough occassionally 

observed





- Language/Auditory Comprehension


Follows: Yes: 1 Stage Simple Commands (WFL), 2 Stage Simple Commands (WFL), 

Complex Commands (WFL)


Observation: Able to respond to yes/no queries: Yes, Yes/No Confusion: No, 

Comprehends Conversational Speech: Yes, Benefits from Slow Speech: No, Benefits 

from Repetiton: No, Benefits from Increased Volume of Speech: No





- Language/Verbal Expression


Able to Respond to Simple Queries: Yes: WNL


Able to Communicate Wants and Needs: Yes: WNL


Functional Communication Status: Yes: WNL


Aware of Errors: Yes


Attempts to Correct Errors: Yes


Use of Gestures: No


Written Expression: Not examined


Oral Expression: Disordered secondary to dysarthria.


Reading Comprehension: Not examined


Calculations: Not examined


Attention: Yes: Intact





- Memory/Perception


Long term Memory: Yes: WNL


Short Term Memory: Yes: WNL





- Swallow Evaluation/Bedside Assessment


Current Nutritional Intake: Regular (heart healthy), Thin Liquids


Tracheostomy Present: No


Patient on Ventilator: No


Dentition: Yes: Adequate (fair condition), Edentulous, Missing Teeth


Facial Symmetry at Rest: Symmetrical


Facial Symmetry on Retraction: Symmetrical


Facial Movement: Controlled


Sensation: Normal


Facial Comment: WFL for speech and swallowing purposes.  


Jaw Position: Closed at Rest


Against Resistance Opening: Normal


Against Resistance Closing: Normal


Pucker Lips: Normal


Smile: Normal


Lips, Comment: WFL for speech and swallowing purposes.  


Lingual Movement: Normal


Lingual Speed of Movement: Normal


Lingual Movement Strgth Against Opposition: Normal


Lingual Movement Characteristics: Normal


Lingual Comment: WFL for speech and swallowing purposes.  


Soft Palate Description: Normal Color


Hard Palate Description: Normal Color


Gag Reflex: Strong


Bite Reflex: Present


Velopharyngeal Movement: Normal


Laryngeal Elevation: WFL


Needs Assistance: No


Rate of Intake: WFL


Bolus Size: WFL


Sensation: Bite Reflex (WFL)


Labial Seal: WFL


Chewing: WFL


Oral Prep Time: WFL


A-P Transit: WFL


Pocketing: None


Timing of Swallow: WFL


Coughing/Throat Clear: No


Change in Voice: No


Other Findings/Remarks: 


79 yo male seen during breakfast at bedside for swallow eval to r/o dysphagia.  

Pt is verbal (reduced intelligibility), A&Ox2, somewhat cooperative.  PMHX 

includes SOB, COPD, heroin dependence CHF?  Vocal quality is WFL with reduced 

precise articulation.  Airway protection appears adequate.  Current diet heart 

healthy solids with thin liquids.   





Pt given po trials of regular solids & pureed during breakfast without 

assistance revealed good acceptance, adequate bolus formation and transport, 

pharyngeal swallows appears timely with no changes in respiration or voicing.





Thin liquid trials via cup and straw were unremarkable for dysphagia and or 

aspiration at bedside at this time.  





Recommendations





- Speech Evaluation, Impression/Plan


Impression: 79 yo male is able to tolerate puree, regular heart healthy solids 

with thin liquids without s/s of aspiration at bedside at this time.  Speech 

intelligibility is reduced.  Vocal quality, intonation are WFL


Long Term Goals: tolerate the least restrictive diet without s/s of aspiration


Short Term Goals: tolerate heart healthy solids with thin liquids without s/s 

of aspiration.





- Dysphagia Impressions/Plan


Swallowing Skills: WFL


Dysphagia Impressions: Minimal Impairment, Risk of Aspiration (monitor 

secondary to SOB, COPD dx)


*Silent aspiration: cannot be R/O at bedside


Dysphagia Treatment Plan: Small Bites, Elevate HOB during feed, OOB for meals, 

Other (monitor pulmonary status)


Dysphagia Evaluation Summary: Continue heart healthy solids with thin liquids 

as tolerate.  Observed standard precautions.  Oral care after meals.  Meds can 

be given whole with water.  Results given to charge RN and PCP via chart.  SLP 

to follow up.





- Recommendations


Diet Consistency: Dietary Restrictions/MD (heart healthy solids)


Medication Administration: Whole with water


Liquids: Thin Liquids

## 2019-03-25 NOTE — DS
Physical Exam: 


SUBJECTIVE: Patient seen and examined








OBJECTIVE:





 Vital Signs











 Period  Temp  Pulse  Resp  BP Sys/Perez  Pulse Ox


 


 Last 24 Hr  97.3 F-98.0 F  62-72  18-20  102-123/58-75  92-98








PHYSICAL EXAM





GENERAL: The patient is awake, alert, and fully oriented, in no acute distress.


HEAD: Normal with no signs of trauma.


EYES: PERRL, extraocular movements intact, sclera anicteric, conjunctiva clear. 


ENT: Ears normal, nares patent, oropharynx clear without exudates, moist mucous 

membranes.


NECK: Trachea midline, full range of motion, supple. 


LUNGS: Breath sounds equal, clear to auscultation bilaterally, no wheezes, no 

crackles, no accessory muscle use. 


HEART: Regular rate and rhythm, S1, S2 without murmur, rub or gallop.


ABDOMEN: Soft, nontender, nondistended, normoactive bowel sounds, no guarding, 

no rebound, no hepatosplenomegaly, no masses.


EXTREMITIES: 2+ pulses, warm, well-perfused, no edema. 


NEUROLOGICAL: Cranial nerves II through XII grossly intact. Normal speech, gait 

not observed.


PSYCH: Normal mood, normal affect.


SKIN: Warm, dry, normal turgor, no rashes or lesions noted.





LABS


 Laboratory Results - last 24 hr











  03/25/19





  06:40


 


WBC  7.9


 


RBC  3.51 L


 


Hgb  9.9 L


 


Hct  30.8 L


 


MCV  87.6


 


MCH  28.1


 


MCHC  32.1


 


RDW  17.5 H


 


Plt Count  310


 


MPV  7.7


 


Absolute Neuts (auto)  5.6


 


Neutrophils %  71.3


 


Lymphocytes %  20.5  D


 


Monocytes %  7.9


 


Eosinophils %  0.0  D


 


Basophils %  0.3


 


Nucleated RBC %  0











HOSPITAL COURSE:





Date of Admission:03/21/19


patient qualifies for portable oxygen to be used at home. 79 y/o male with PMH 

of COPD, heroin use on methaodne, CAD s/p stent, who presented with hypoxia and 

was found to have acute hypoxic hypercapnic respiratory failure. he was started 

on IV solumedor; duonebs and BIPAP when needed he was then transitioned to 

nasal canuala. he was started on a steroid taper with improevemtn in his 

respiraotory status - he was also seen by detox dr as his mental status was 

waxing and waning likely 2/2 hypercapnea so he was placed back on BIPAP and in 

addition will liekly need change in metahdone dose- he was d/c home with home 

o2 as he qualified given his pre and post also home with PT as he refused SNF


Date of Discharge: 03/25/19











Minutes to complete discharge: 39





Discharge Summary


Reason For Visit: ACUTE RESPIRATORY FAILURE


Current Active Problems





Heroin addiction (Chronic)








Condition: Improved





- Instructions


Diet, Activity, Other Instructions: 


You came to the hospital with complaints of trouble breathing likely due to an 

exacerbation of your chronic obstructive pulmonary disease. You were seen by a 

pulmonologist, we have been treating you with steroids and your symptoms have 

improved. In addition, you were seen by an addiction specialist you feels your 

Methadone dosing needs to be adjusted. We are also setting up for you to have 

oxygen at home. With having oxygen at home you cannot smoke. 


Use 1 liter of oxygen at reast and 2-3 Liters with ambulation 





Please continue taking your home medications in addition:


Please take the Vantin  200mg twice a day for tomorrow


We are also sending you home on a Prednisone taper:


40mg on (3/26-29)


30mg (3/30-4/2)


20mg (4/3-4/6)


10mg (4/7-4/10)


END:





Please follow up with Dr. Blair, the pulmonologist within one week


Please follow up with your primary care physician within one week and have iron 

studies done as your blood level was low in the hospital





*if you begin to experience worsening shortness of breath, chest pains, fevers, 

please return to the emergency room immediately





Referrals: 


Xiang Blair MD [Staff Physician] - 1 Week


Jeaneth Valencia DO [Staff Physician] - 1 Week


Disposition: HOME





- Home Medications


Comprehensive Discharge Medication List: 


Ambulatory Orders





Albuterol Sulfate Inhaler - [Ventolin HFA Inhaler -] 1 - 2 inh PO QID PRN 06/05/ 14 


Atorvastatin Calcium 80 mg PO DAILY 03/22/19 


Clopidogrel Bisulfate [Plavix -] 75 mg PO DAILY 03/22/19 


Ferrous Sulfate 325 mg PO ASDIR 03/22/19 


Furosemide [Lasix -] 40 mg PO DAILY 03/22/19 


Metoprolol Tartrate [Lopressor -] 25 mg PO BID 03/22/19 


Multivitamins [Multivit ([facility] Form[facility])] 1 tab PO DAILY 03/22/19 


Cefpodoxime Proxetil [Vantin -] 200 mg PO Q12H #2 tablet 03/25/19 


predniSONE [Deltasone -] See Taper PO DAILY 16 Days  tablet 03/25/19 











Problem List





- Problems


(1) Acute hypercapnic respiratory failure


Code(s): J96.02 - ACUTE RESPIRATORY FAILURE WITH HYPERCAPNIA   





(2) COPD (chronic obstructive pulmonary disease)


Code(s): J44.9 - CHRONIC OBSTRUCTIVE PULMONARY DISEASE, UNSPECIFIED   


Qualifiers: 


   COPD type: unspecified COPD   Qualified Code(s): J44.9 - Chronic obstructive 

pulmonary disease, unspecified   





(3) Hepatitis C


Code(s): B19.20 - UNSPECIFIED VIRAL HEPATITIS C WITHOUT HEPATIC COMA   


Qualifiers: 


   Viral hepatitis chronicity: chronic 





(4) Nicotine dependence


Code(s): F17.200 - NICOTINE DEPENDENCE, UNSPECIFIED, UNCOMPLICATED   


Qualifiers: 


   Nicotine product type: cigarettes 


This patient is new to me today: No


Emergency Visit: Yes


ED Registration Date: 03/21/19


Care time: The patient presented to the Emergency Department on the above date 

and was hospitalized for further evaluation of their emergent condition.


Critical Care patient: No





- Discharge Referral


Referred to [facility] No

## 2019-03-25 NOTE — PN
Teaching Attending Note


Name of Resident: Stephanie Valerio





ATTENDING PHYSICIAN STATEMENT





I saw and evaluated the patient.


I reviewed the resident's note and discussed the case with the resident.


I agree with the resident's findings and plan as documented.








SUBJECTIVE:


No fever or chills. No HA , NO SOB. 





OBJECTIVE:


NAD, awake, and cooperative 


Cv: RRR, no MRG, no JVD 


Lungs: imporved air entry in upper lobes, and decreased half way down, no 

wheezing 


Ext: scarred skin with trace edema on legs.








ASSESSMENT AND PLAN:





79 y/o man with h/o COPD, active smoking, active heroin use while on Methadone, 

CAD , s/p stenting, who presented with hypoxia . he was found to have acuete 

hypoxic hypercapnic respiratory failure 





1- Acute hypoxic , hypercapnic resp failure due to acute COPD exacerbation. 

possible b/l PNA vs old scarring 


condition slightly improved 


- cont NC. need 1 L of O2 at rest ( as 2 L increased his sats to 97%). 

requirements with ambulation could not be assessed as he did not walk. will 

instruct to use 2-3 L with ambulationif he does. 


- he was instructed not to smoke to avoid facial /whole body burns. he said he 

already quit smoking 


- ocnt steroid taer at  dc 


- Abx for one more day at dc to complete 5 days of treatment 


- f/u with pulm as out pt 





2- Heroin abuse. while on methadone 70 mg 


- will cont his home dose for now. f/u with his methadone clinic as out pt 





3- Prolonged QTC: due to methadone and heroin use. 





4- Normocytic anemia: iron studies do not suggest iron def. B12, folate NL. f/u 

as out pt 


5- severe protein calorie malnutrition 


6- metabolic encephalopathy , due to hypercapnia , resolved.





dispo : DC home. refused rehab. o2 arranged